# Patient Record
Sex: FEMALE | Race: WHITE | NOT HISPANIC OR LATINO | Employment: OTHER | URBAN - METROPOLITAN AREA
[De-identification: names, ages, dates, MRNs, and addresses within clinical notes are randomized per-mention and may not be internally consistent; named-entity substitution may affect disease eponyms.]

---

## 2017-03-13 ENCOUNTER — HOSPITAL ENCOUNTER (OUTPATIENT)
Dept: LAB | Facility: MEDICAL CENTER | Age: 72
End: 2017-03-13
Attending: FAMILY MEDICINE
Payer: MEDICARE

## 2017-03-13 LAB
25(OH)D3 SERPL-MCNC: 41 NG/ML (ref 30–100)
ALBUMIN SERPL BCP-MCNC: 4.3 G/DL (ref 3.2–4.9)
ALBUMIN/GLOB SERPL: 1.4 G/DL
ALP SERPL-CCNC: 47 U/L (ref 30–99)
ALT SERPL-CCNC: 21 U/L (ref 2–50)
ANION GAP SERPL CALC-SCNC: 7 MMOL/L (ref 0–11.9)
AST SERPL-CCNC: 24 U/L (ref 12–45)
BILIRUB SERPL-MCNC: 0.6 MG/DL (ref 0.1–1.5)
BUN SERPL-MCNC: 10 MG/DL (ref 8–22)
CALCIUM SERPL-MCNC: 9.7 MG/DL (ref 8.5–10.5)
CHLORIDE SERPL-SCNC: 101 MMOL/L (ref 96–112)
CHOLEST SERPL-MCNC: 175 MG/DL (ref 100–199)
CO2 SERPL-SCNC: 29 MMOL/L (ref 20–33)
CREAT SERPL-MCNC: 0.95 MG/DL (ref 0.5–1.4)
GLOBULIN SER CALC-MCNC: 3 G/DL (ref 1.9–3.5)
GLUCOSE SERPL-MCNC: 85 MG/DL (ref 65–99)
HDLC SERPL-MCNC: 47 MG/DL
LDLC SERPL CALC-MCNC: 92 MG/DL
POTASSIUM SERPL-SCNC: 4.1 MMOL/L (ref 3.6–5.5)
PROT SERPL-MCNC: 7.3 G/DL (ref 6–8.2)
SODIUM SERPL-SCNC: 137 MMOL/L (ref 135–145)
TRIGL SERPL-MCNC: 181 MG/DL (ref 0–149)

## 2017-03-13 PROCEDURE — 82306 VITAMIN D 25 HYDROXY: CPT

## 2017-03-13 PROCEDURE — 36415 COLL VENOUS BLD VENIPUNCTURE: CPT

## 2017-03-13 PROCEDURE — 80061 LIPID PANEL: CPT

## 2017-03-13 PROCEDURE — 80053 COMPREHEN METABOLIC PANEL: CPT

## 2017-07-20 ENCOUNTER — HOSPITAL ENCOUNTER (OUTPATIENT)
Dept: RADIOLOGY | Facility: MEDICAL CENTER | Age: 72
End: 2017-07-20
Attending: FAMILY MEDICINE
Payer: MEDICARE

## 2017-07-20 DIAGNOSIS — Z12.31 VISIT FOR SCREENING MAMMOGRAM: ICD-10-CM

## 2017-07-20 PROCEDURE — 77063 BREAST TOMOSYNTHESIS BI: CPT

## 2017-09-29 ENCOUNTER — GYNECOLOGY VISIT (OUTPATIENT)
Dept: OBGYN | Facility: CLINIC | Age: 72
End: 2017-09-29
Payer: MEDICARE

## 2017-09-29 VITALS — SYSTOLIC BLOOD PRESSURE: 102 MMHG | DIASTOLIC BLOOD PRESSURE: 62 MMHG | BODY MASS INDEX: 23.95 KG/M2 | WEIGHT: 131 LBS

## 2017-09-29 DIAGNOSIS — N95.1 MENOPAUSAL SYMPTOMS: ICD-10-CM

## 2017-09-29 PROCEDURE — 99213 OFFICE O/P EST LOW 20 MIN: CPT | Performed by: OBSTETRICS & GYNECOLOGY

## 2017-09-29 NOTE — PROGRESS NOTES
S: Brenda presents for refill of hrt.  She is doing well on premarin 0.3 mg daily.  When she tries to wean she feels miserable with frequent hot flashes, insomnia, and mood swings.  Not sexually active. No vaginal bleeding.     O:Blood pressure 102/62, weight 59.4 kg (131 lb).  GENERAL: Alert, in no apparent distress  PSYCHIATRIC: Appropriate affect, intact insight and judgement.  NECK:  Nontender, no masses.  No Thyromegaly or nodules. No lymphadenopathy.  ABDOMEN: Soft, nontender, nondistended.  No palpable masses.  No rebound or guarding.  No inguinal lymphadenopathy.  No hernias.  No hepatosplenomegaly.  BACK: No CVA tenderness  SKIN: no rash.    EXT: no edema, calves NT.    BREAST: No masses or tenderness.  No skin changes.  No nipple inversion or discharge. No axillary lymphadenopathy.      GENITOURINARY:  Normal external genitalia, no lesions.  Normal urethral meatus, no masses or tenderness.  Normal bladder without fullness or masses.  Vagina atrophic, scant white physiologic discharge.   Cervix absent.  Vaginal vault apex well supported.  Uterus absent.  Adnexa nontender, no masses.  Normal anus and perineum.  Rectal exam - not indicated.      A/P:  1. Menopausal symptoms - stable on premarin 0.3 mg daily.  Refilled for one year.  Hx of endometrial cancer, hrt previously ok'd by her oncologist.  She is aware of the risk of recurrent cancer, breast cancer, stroke, and MI and feels her symptoms are severe enough to continue therapy.    F/U 1 year or prn.

## 2018-04-10 ENCOUNTER — HOSPITAL ENCOUNTER (OUTPATIENT)
Dept: LAB | Facility: MEDICAL CENTER | Age: 73
End: 2018-04-10
Attending: FAMILY MEDICINE
Payer: MEDICARE

## 2018-04-10 LAB
ALBUMIN SERPL BCP-MCNC: 4.6 G/DL (ref 3.2–4.9)
ALBUMIN/GLOB SERPL: 1.9 G/DL
ALP SERPL-CCNC: 38 U/L (ref 30–99)
ALT SERPL-CCNC: 14 U/L (ref 2–50)
ANION GAP SERPL CALC-SCNC: 4 MMOL/L (ref 0–11.9)
AST SERPL-CCNC: 17 U/L (ref 12–45)
BILIRUB SERPL-MCNC: 0.6 MG/DL (ref 0.1–1.5)
BUN SERPL-MCNC: 11 MG/DL (ref 8–22)
CALCIUM SERPL-MCNC: 9.7 MG/DL (ref 8.5–10.5)
CHLORIDE SERPL-SCNC: 102 MMOL/L (ref 96–112)
CHOLEST SERPL-MCNC: 171 MG/DL (ref 100–199)
CO2 SERPL-SCNC: 31 MMOL/L (ref 20–33)
CREAT SERPL-MCNC: 1.06 MG/DL (ref 0.5–1.4)
GLOBULIN SER CALC-MCNC: 2.4 G/DL (ref 1.9–3.5)
GLUCOSE SERPL-MCNC: 93 MG/DL (ref 65–99)
HDLC SERPL-MCNC: 48 MG/DL
LDLC SERPL CALC-MCNC: 89 MG/DL
POTASSIUM SERPL-SCNC: 4.3 MMOL/L (ref 3.6–5.5)
PROT SERPL-MCNC: 7 G/DL (ref 6–8.2)
SODIUM SERPL-SCNC: 137 MMOL/L (ref 135–145)
TRIGL SERPL-MCNC: 169 MG/DL (ref 0–149)

## 2018-04-10 PROCEDURE — 36415 COLL VENOUS BLD VENIPUNCTURE: CPT

## 2018-04-10 PROCEDURE — 80061 LIPID PANEL: CPT

## 2018-04-10 PROCEDURE — 80053 COMPREHEN METABOLIC PANEL: CPT

## 2021-01-15 DIAGNOSIS — Z23 NEED FOR VACCINATION: ICD-10-CM
